# Patient Record
Sex: MALE | Race: WHITE | Employment: UNEMPLOYED | URBAN - METROPOLITAN AREA
[De-identification: names, ages, dates, MRNs, and addresses within clinical notes are randomized per-mention and may not be internally consistent; named-entity substitution may affect disease eponyms.]

---

## 2019-11-16 ENCOUNTER — HOSPITAL ENCOUNTER (EMERGENCY)
Age: 44
Discharge: ANOTHER HOSPITAL | End: 2019-11-16
Attending: EMERGENCY MEDICINE
Payer: SELF-PAY

## 2019-11-16 VITALS
TEMPERATURE: 97.8 F | DIASTOLIC BLOOD PRESSURE: 68 MMHG | WEIGHT: 194 LBS | HEART RATE: 95 BPM | RESPIRATION RATE: 26 BRPM | SYSTOLIC BLOOD PRESSURE: 111 MMHG | OXYGEN SATURATION: 96 %

## 2019-11-16 DIAGNOSIS — T23.061A BURN OF BACK OF RIGHT HAND, UNSPECIFIED BURN DEGREE, INITIAL ENCOUNTER: Primary | ICD-10-CM

## 2019-11-16 PROCEDURE — 74011000250 HC RX REV CODE- 250: Performed by: EMERGENCY MEDICINE

## 2019-11-16 PROCEDURE — 99285 EMERGENCY DEPT VISIT HI MDM: CPT

## 2019-11-16 PROCEDURE — 74011250636 HC RX REV CODE- 250/636: Performed by: EMERGENCY MEDICINE

## 2019-11-16 PROCEDURE — 96374 THER/PROPH/DIAG INJ IV PUSH: CPT

## 2019-11-16 RX ORDER — SODIUM CHLORIDE 0.9 % (FLUSH) 0.9 %
5-40 SYRINGE (ML) INJECTION AS NEEDED
Status: DISCONTINUED | OUTPATIENT
Start: 2019-11-16 | End: 2019-11-17 | Stop reason: HOSPADM

## 2019-11-16 RX ORDER — HYDROMORPHONE HYDROCHLORIDE 1 MG/ML
1 INJECTION, SOLUTION INTRAMUSCULAR; INTRAVENOUS; SUBCUTANEOUS ONCE
Status: COMPLETED | OUTPATIENT
Start: 2019-11-16 | End: 2019-11-16

## 2019-11-16 RX ORDER — SODIUM CHLORIDE 0.9 % (FLUSH) 0.9 %
5-40 SYRINGE (ML) INJECTION EVERY 8 HOURS
Status: DISCONTINUED | OUTPATIENT
Start: 2019-11-16 | End: 2019-11-17 | Stop reason: HOSPADM

## 2019-11-16 RX ADMIN — HYDROMORPHONE HYDROCHLORIDE 1 MG: 1 INJECTION, SOLUTION INTRAMUSCULAR; INTRAVENOUS; SUBCUTANEOUS at 20:20

## 2019-11-16 RX ADMIN — HYDROMORPHONE HYDROCHLORIDE 1 MG: 1 INJECTION, SOLUTION INTRAMUSCULAR; INTRAVENOUS; SUBCUTANEOUS at 20:37

## 2019-11-16 RX ADMIN — COLLAGENASE SANTYL: 250 OINTMENT TOPICAL at 21:10

## 2019-11-17 NOTE — ED PROVIDER NOTES
This is a 43-year-old male comes emergency room with a chief complaint of right hand burn. Patient states that he was at home and tripped and fell onto the wood stove. Patient states his right hand hit the wood stove. Patient states that he immediately had pain. Patient is left-hand dominant. Patient's tetanus shot is up-to-date. Patient is placed ice on the burn area. Patient comes in for further evaluation and treatment. Burn   The incident occurred 1 to 2 hours ago. The burns occurred at home. Burn context: Trip and fall. The burns were a result of contact with a hot surface. The burns are located on the right hand. The burns appear blisters present, paleness and pain. The pain is at a severity of 10/10. The pain is severe. He has tried ice for the symptoms. Past Medical History:   Diagnosis Date    Atrial fibrillation [facility])        Past Surgical History:   Procedure Laterality Date    HX ORTHOPAEDIC      rt hand, left ankle         History reviewed. No pertinent family history. Social History     Socioeconomic History    Marital status:      Spouse name: Not on file    Number of children: Not on file    Years of education: Not on file    Highest education level: Not on file   Occupational History    Not on file   Social Needs    Financial resource strain: Not on file    Food insecurity:     Worry: Not on file     Inability: Not on file    Transportation needs:     Medical: Not on file     Non-medical: Not on file   Tobacco Use    Smoking status: Current Every Day Smoker   Substance and Sexual Activity    Alcohol use:  Yes    Drug use: Never    Sexual activity: Not on file   Lifestyle    Physical activity:     Days per week: Not on file     Minutes per session: Not on file    Stress: Not on file   Relationships    Social connections:     Talks on phone: Not on file     Gets together: Not on file     Attends Orthodoxy service: Not on file     Active member of club or organization: Not on file     Attends meetings of clubs or organizations: Not on file     Relationship status: Not on file    Intimate partner violence:     Fear of current or ex partner: Not on file     Emotionally abused: Not on file     Physically abused: Not on file     Forced sexual activity: Not on file   Other Topics Concern    Not on file   Social History Narrative    Not on file     ALLERGIES: Pcn [penicillins]    Review of Systems   Constitutional: Negative for appetite change, chills, fever and unexpected weight change. HENT: Negative for ear pain, hearing loss, rhinorrhea and trouble swallowing. Eyes: Negative for pain and visual disturbance. Respiratory: Negative for cough, chest tightness and shortness of breath. Cardiovascular: Negative for chest pain and palpitations. Gastrointestinal: Negative for abdominal distention, abdominal pain, blood in stool and vomiting. Genitourinary: Negative for dysuria, hematuria and urgency. Musculoskeletal: Negative for back pain and myalgias. Skin: Positive for wound. Negative for rash. Neurological: Negative for dizziness, syncope, weakness and numbness. Psychiatric/Behavioral: Negative for confusion and suicidal ideas. All other systems reviewed and are negative. Vitals:    11/16/19 2010 11/16/19 2019 11/16/19 2020 11/16/19 2030   BP: (!) 146/94 140/88  126/77   Pulse: 95      Resp: 26      Temp: 97.1 °F (36.2 °C)      SpO2: 98%  97% 95%   Weight: 88 kg (194 lb 0.1 oz)               Physical Exam   Constitutional: He is oriented to person, place, and time. He appears well-developed and well-nourished. No distress. HENT:   Head: Normocephalic and atraumatic. Right Ear: External ear normal.   Left Ear: External ear normal.   Nose: Nose normal.   Mouth/Throat: Oropharynx is clear and moist. No oropharyngeal exudate. Eyes: Pupils are equal, round, and reactive to light.  Conjunctivae and EOM are normal. Right eye exhibits no discharge. Left eye exhibits no discharge. No scleral icterus. Neck: Normal range of motion. Neck supple. No JVD present. No tracheal deviation present. Cardiovascular: Normal rate, regular rhythm, normal heart sounds and intact distal pulses. Exam reveals no gallop and no friction rub. No murmur heard. Pulmonary/Chest: Effort normal and breath sounds normal. No stridor. No respiratory distress. He has no decreased breath sounds. He has no wheezes. He has no rhonchi. He has no rales. He exhibits no tenderness. Musculoskeletal: Normal range of motion. He exhibits no edema. Right hand: He exhibits tenderness. Normal sensation noted. Normal strength noted. Hands:  Neurological: He is alert and oriented to person, place, and time. He has normal strength and normal reflexes. He displays normal reflexes. No cranial nerve deficit or sensory deficit. He exhibits normal muscle tone. Coordination normal. GCS eye subscore is 4. GCS verbal subscore is 5. GCS motor subscore is 6. Skin: Skin is warm and dry. Capillary refill takes less than 2 seconds. Burn noted. No rash noted. He is not diaphoretic. No erythema. No pallor. Psychiatric: He has a normal mood and affect. His behavior is normal. Judgment and thought content normal.   Nursing note and vitals reviewed. MDM  Number of Diagnoses or Management Options  Risk of Complications, Morbidity, and/or Mortality  Presenting problems: moderate  Diagnostic procedures: low  Management options: moderate    Patient Progress  Patient progress: stable       Procedures    Chief Complaint   Patient presents with    Burn       The patient's presenting problems have been discussed, and they are in agreement with the care plan formulated and outlined with them. I have encouraged them to ask questions as they arise throughout their visit.     MEDICATIONS GIVEN:  Medications   sodium chloride (NS) flush 5-40 mL (has no administration in time range)   sodium chloride (NS) flush 5-40 mL (has no administration in time range)   collagenase (SANTYL) 250 unit/gram ointment (has no administration in time range)   HYDROmorphone (DILAUDID) syringe 1 mg (1 mg IntraVENous Given 11/16/19 2020)   HYDROmorphone (DILAUDID) syringe 1 mg (1 mg IntraVENous Given 11/16/19 2037)       LABS REVIEWED:  No results found for this or any previous visit (from the past 24 hour(s)). VITAL SIGNS:  Patient Vitals for the past 24 hrs:   Temp Pulse Resp BP SpO2   11/16/19 2030    126/77 95 %   11/16/19 2020     97 %   11/16/19 2019    140/88    11/16/19 2010 97.1 °F (36.2 °C) 95 26 (!) 146/94 98 %       RADIOLOGY RESULTS:  The following have been ordered and reviewed:  No results found. CONSULTATIONS:   CONSULT NOTE:  8:24 PM Irene Flowers DO spoke with Dr. Dorys Judge, Consult for Burn. Discussed available diagnostic tests and clinical findings. He is in agreement with care plans as outlined. Dr. Olga Gaitan recommends transfer to [facility] ED further evaluation and treatment. CONSULT NOTE:  8:25 PM Irene Flowers DO spoke with Dr. Piper Cleveland, Consult for emergency room attending. Discussed available diagnostic tests and clinical findings. He is in agreement with care plans as outlined. Dr. Piper Cleveland accepts pt to [facility] ED for further evaluation and treatment. PROGRESS NOTES:  Discussed results and plan with patient. Patient will be transferred to [facility] for further evaluation and treatment. DIAGNOSIS:    1. Burn of back of right hand, unspecified burn degree, initial encounter        PLAN:  Transfer to [facility] for burn evaluation. ED COURSE: The patient's hospital course has been uncomplicated.

## 2019-11-17 NOTE — ED NOTES
Pt given apple juice per his request. Pt's rt hand covered with Santyl cream per orders. Pt states that he has had some pain reduction with medication. Pt continues to wait for transport to [facility]/ ED. Call bell within reach. Wife at bedside.

## 2019-11-17 NOTE — ED NOTES
Discharge or Transfer Assessment: Patient A&O x 4 and in no distress. Physical re-examination reveals  improvement in pt's condition with reassessment of vital signs completed at the time of admission transfer and/or discharge.

## 2019-11-17 NOTE — ED NOTES
DRT at bedside to transport pt to [facility]. Pt report, emtala, facesheet, triage summary, ed encounter given to DRT. Wife to follow squad. Pt update given to Gretchen edgar [facility]. The patient was discharged to [facility]ED by Dr. Eh Mccann and Merle Walters rn in stable condition, accompanied by wife and DRT. The patient is alert and oriented, is in no respiratory distress and has vital signs within normal limits . The patient's diagnosis, condition and treatment were explained to DRT. DRT expressed understanding. No prescriptions given to pt. No work/school note given to pt. A discharge plan has been developed. A  was not involved in the process. Aftercare instructions were given to the patient. Pt transported to [facility]/ED.

## 2019-11-17 NOTE — ED NOTES
Pt's sats lowered to 89 percent due to medications given. Pt placed on 2 liters of oxygen by NC. Dr. Maryanne Guadarrama advised.

## 2019-11-17 NOTE — ROUTINE PROCESS
TRANSFER - OUT REPORT: 
 
Verbal report given to Rosa Maria Funk RN on Serina Shove  being transferred to [facility]ED for routine progression of care Report consisted of patients Situation, Background, Assessment and  
Recommendations(SBAR). Information from the following report(s) SBAR, Kardex, ED Summary, [facility] and Recent Results was reviewed with the receiving nurse. Lines:  
Peripheral IV 11/16/19 Left Antecubital (Active) Site Assessment Clean, dry, & intact 11/16/2019  8:18 PM  
Phlebitis Assessment 0 11/16/2019  8:18 PM  
Infiltration Assessment 0 11/16/2019  8:18 PM  
Dressing Status Clean, dry, & intact 11/16/2019  8:18 PM  
Dressing Type Bacteriocidal 11/16/2019  8:18 PM  
Hub Color/Line Status Pink 11/16/2019  8:18 PM  
Action Taken Catheter retaped 11/16/2019  8:18 PM  
Alcohol Cap Used Yes 11/16/2019  8:18 PM  
  
 
Opportunity for questions and clarification was provided.    
 
Patient transported with: 
 Monitor, 20g in left [facility]

## 2020-02-01 ENCOUNTER — HOSPITAL ENCOUNTER (EMERGENCY)
Age: 45
Discharge: HOME | End: 2020-02-02
Attending: EMERGENCY MEDICINE
Payer: SELF-PAY

## 2020-02-01 DIAGNOSIS — R51.9 NONINTRACTABLE HEADACHE, UNSPECIFIED CHRONICITY PATTERN, UNSPECIFIED HEADACHE TYPE: Primary | ICD-10-CM

## 2020-02-01 PROCEDURE — 96375 TX/PRO/DX INJ NEW DRUG ADDON: CPT

## 2020-02-01 PROCEDURE — 99284 EMERGENCY DEPT VISIT MOD MDM: CPT

## 2020-02-01 PROCEDURE — 96374 THER/PROPH/DIAG INJ IV PUSH: CPT

## 2020-02-02 VITALS
DIASTOLIC BLOOD PRESSURE: 84 MMHG | HEART RATE: 85 BPM | TEMPERATURE: 98.5 F | OXYGEN SATURATION: 100 % | RESPIRATION RATE: 20 BRPM | SYSTOLIC BLOOD PRESSURE: 134 MMHG

## 2020-02-02 PROCEDURE — 74011250636 HC RX REV CODE- 250/636: Performed by: EMERGENCY MEDICINE

## 2020-02-02 RX ORDER — METOCLOPRAMIDE HYDROCHLORIDE 5 MG/ML
10 INJECTION INTRAMUSCULAR; INTRAVENOUS
Status: COMPLETED | OUTPATIENT
Start: 2020-02-02 | End: 2020-02-02

## 2020-02-02 RX ORDER — DIPHENHYDRAMINE HYDROCHLORIDE 50 MG/ML
25 INJECTION, SOLUTION INTRAMUSCULAR; INTRAVENOUS
Status: COMPLETED | OUTPATIENT
Start: 2020-02-02 | End: 2020-02-02

## 2020-02-02 RX ORDER — DEXAMETHASONE SODIUM PHOSPHATE 10 MG/ML
10 INJECTION INTRAMUSCULAR; INTRAVENOUS ONCE
Status: COMPLETED | OUTPATIENT
Start: 2020-02-02 | End: 2020-02-02

## 2020-02-02 RX ADMIN — SODIUM CHLORIDE 1000 ML: 900 INJECTION, SOLUTION INTRAVENOUS at 00:09

## 2020-02-02 RX ADMIN — METOCLOPRAMIDE 10 MG: 5 INJECTION, SOLUTION INTRAMUSCULAR; INTRAVENOUS at 00:11

## 2020-02-02 RX ADMIN — DEXAMETHASONE SODIUM PHOSPHATE 10 MG: 10 INJECTION, SOLUTION INTRAMUSCULAR; INTRAVENOUS at 00:09

## 2020-02-02 RX ADMIN — DIPHENHYDRAMINE HYDROCHLORIDE 25 MG: 50 INJECTION, SOLUTION INTRAMUSCULAR; INTRAVENOUS at 00:09

## 2020-02-02 NOTE — ED NOTES
Discharge note: The patient was discharged home in stable condition, accompanied by family member. The patient is alert and oriented, is in no respiratory distress. The patient's diagnosis, condition and treatment were explained to patient by Dr Jami Cope and reinforced by nurse. The patient expressed understanding of discharge instructions and plan of care. A discharge plan has been developed. A  was not involved in the process. Patient offered a wheelchair to ED lobby for discharge but declined at this time. Patient ambulatory to ED lobby to go home with family member.

## 2020-02-02 NOTE — ED PROVIDER NOTES
Date of Service:  2/1/2020    Patient:  Marly Langley    Chief Complaint:  Headache       HPI:  Marly Langley is a 45 y.o.  male who presents for evaluation of headache. Patient states that tonight while watching TV he started having a right-sided headache that was behind the right eye radiated to the back of his head. He said he then had pain down his arm and into his right foot. He states he is never quite had a pain like this leg it is 7 out of 10 however he does not describe it as the worst pain he is ever felt. It is been episodic and is been coming and going over the last hour or 2. At present the patient has 0 pain. Patient denies any type of nausea or vomiting with this. He denies any change in vision or hearing. No numbness or tingling. He does note to drinking tonight but has not had any alcohol since last weekend. He is a chronic everyday smoker. Otherwise he denies any of acute complaints           Past Medical History:   Diagnosis Date    Atrial fibrillation [facility])        Past Surgical History:   Procedure Laterality Date    HX ORTHOPAEDIC      rt hand, left ankle         History reviewed. No pertinent family history. Social History     Socioeconomic History    Marital status:      Spouse name: Not on file    Number of children: Not on file    Years of education: Not on file    Highest education level: Not on file   Occupational History    Not on file   Social Needs    Financial resource strain: Not on file    Food insecurity:     Worry: Not on file     Inability: Not on file    Transportation needs:     Medical: Not on file     Non-medical: Not on file   Tobacco Use    Smoking status: Current Every Day Smoker    Smokeless tobacco: Never Used   Substance and Sexual Activity    Alcohol use:  Yes     Alcohol/week: 13.0 standard drinks     Types: 12 Cans of beer, 1 Shots of liquor per week    Drug use: Never    Sexual activity: Not on file   Lifestyle    Physical activity: Days per week: Not on file     Minutes per session: Not on file    Stress: Not on file   Relationships    Social connections:     Talks on phone: Not on file     Gets together: Not on file     Attends Oriental orthodox service: Not on file     Active member of club or organization: Not on file     Attends meetings of clubs or organizations: Not on file     Relationship status: Not on file    Intimate partner violence:     Fear of current or ex partner: Not on file     Emotionally abused: Not on file     Physically abused: Not on file     Forced sexual activity: Not on file   Other Topics Concern    Not on file   Social History Narrative    Not on file         ALLERGIES: Pcn [penicillins]    Review of Systems   Constitutional: Negative for fever. HENT: Negative for hearing loss. Eyes: Negative for visual disturbance. Respiratory: Negative for shortness of breath. Cardiovascular: Negative for chest pain. Gastrointestinal: Negative for abdominal pain. Genitourinary: Negative for flank pain. Musculoskeletal: Negative for back pain. Skin: Negative for rash. Neurological: Positive for headaches. Negative for dizziness, tremors, facial asymmetry, speech difficulty, weakness, light-headedness and numbness. Psychiatric/Behavioral: Negative for confusion. Vitals:    02/01/20 2336 02/02/20 0001   BP: 136/86 134/84   Pulse: 85    Resp: 20 20   Temp: 98.5 °F (36.9 °C)    SpO2: 99% 98%            Physical Exam  Constitutional:       General: He is not in acute distress. Appearance: He is well-developed. He is not ill-appearing, toxic-appearing or diaphoretic. HENT:      Head: Normocephalic and atraumatic. Nose: Nose normal. No congestion or rhinorrhea. Mouth/Throat:      Mouth: Mucous membranes are moist.      Pharynx: No oropharyngeal exudate or posterior oropharyngeal erythema. Eyes:      General: No scleral icterus. Right eye: No discharge. Left eye: No discharge. Extraocular Movements: Extraocular movements intact. Conjunctiva/sclera: Conjunctivae normal.      Pupils: Pupils are equal, round, and reactive to light. Neck:      Musculoskeletal: Normal range of motion and neck supple. No neck rigidity or muscular tenderness. Vascular: No JVD. Trachea: No tracheal deviation. Cardiovascular:      Rate and Rhythm: Normal rate and regular rhythm. Pulses: Normal pulses. Heart sounds: No murmur. Pulmonary:      Effort: Pulmonary effort is normal. No respiratory distress. Abdominal:      General: Abdomen is flat. Musculoskeletal:         General: No tenderness or deformity. Skin:     General: Skin is warm and dry. Capillary Refill: Capillary refill takes less than 2 seconds. Findings: No rash. Neurological:      General: No focal deficit present. Mental Status: He is alert and oriented to person, place, and time. Mental status is at baseline. Cranial Nerves: No cranial nerve deficit. Sensory: No sensory deficit. Motor: No weakness. Coordination: Coordination normal.   Psychiatric:         Mood and Affect: Mood normal.         Behavior: Behavior normal.         Thought Content: Thought content normal.         Judgment: Judgment normal.          MDM  Number of Diagnoses or Management Options  Nonintractable headache, unspecified chronicity pattern, unspecified headache type:         VITAL SIGNS:  Patient Vitals for the past 4 hrs:   Temp Pulse Resp BP SpO2   02/02/20 0001   20 134/84 98 %   02/01/20 2336 98.5 °F (36.9 °C) 85 20 136/86 99 %         LABS:  No results found for this or any previous visit (from the past 6 hour(s)).      IMAGING:  No orders to display         Medications During Visit:  Medications   metoclopramide HCl (REGLAN) injection 10 mg (10 mg IntraVENous Given 2/2/20 0011)   diphenhydrAMINE (BENADRYL) injection 25 mg (25 mg IntraVENous Given 2/2/20 0009)   sodium chloride 0.9 % bolus infusion 1,000 mL (1,000 mL IntraVENous New Bag 2/2/20 0009)   dexamethasone (PF) (DECADRON) injection 10 mg (10 mg IntraVENous Given 2/2/20 0009)         DECISION MAKING:  Kylah Barahona is a 45 y.o. male who comes in as above. Here he appears well. Patient feels better after the medications provided. At this time I think his risk of intracranial abnormality is low given his symptoms and the description of them. They could be cluster headaches. At this time patient feels better. DC home with sober . Patient agreeable to follow with provided PCP. IMPRESSION:  1. Nonintractable headache, unspecified chronicity pattern, unspecified headache type        DISPOSITION:  DischargedDischarge      There are no discharge medications for this patient. Follow-up Information     Follow up With Specialties Details Why Contact Info    Provided PCP  Schedule an appointment as soon as possible for a visit               The patient is asked to follow-up with their primary care provider in the next several days. They are to call tomorrow for an appointment. The patient is asked to return promptly for any increased concerns or worsening of symptoms. They can return to this emergency department or any other emergency department.       Procedures

## 2020-02-02 NOTE — ED TRIAGE NOTES
Triage note:  Pt arrived via [id number] for c/o headache that started ~ 1 hour PTA. Pt stated he has been Drinking 12 pk of beer and bourbon tonight.